# Patient Record
Sex: MALE | Race: OTHER | HISPANIC OR LATINO | URBAN - METROPOLITAN AREA
[De-identification: names, ages, dates, MRNs, and addresses within clinical notes are randomized per-mention and may not be internally consistent; named-entity substitution may affect disease eponyms.]

---

## 2024-07-20 ENCOUNTER — EMERGENCY (EMERGENCY)
Facility: HOSPITAL | Age: 39
LOS: 1 days | Discharge: HOME | End: 2024-07-20
Attending: STUDENT IN AN ORGANIZED HEALTH CARE EDUCATION/TRAINING PROGRAM
Payer: SELF-PAY

## 2024-07-20 VITALS
RESPIRATION RATE: 18 BRPM | SYSTOLIC BLOOD PRESSURE: 133 MMHG | OXYGEN SATURATION: 98 % | HEART RATE: 50 BPM | DIASTOLIC BLOOD PRESSURE: 80 MMHG | TEMPERATURE: 99 F

## 2024-07-20 PROCEDURE — 99283 EMERGENCY DEPT VISIT LOW MDM: CPT | Mod: 25

## 2024-07-20 PROCEDURE — 99284 EMERGENCY DEPT VISIT MOD MDM: CPT

## 2024-07-20 PROCEDURE — 73140 X-RAY EXAM OF FINGER(S): CPT

## 2024-07-20 PROCEDURE — 73130 X-RAY EXAM OF HAND: CPT | Mod: 26,RT

## 2024-07-20 PROCEDURE — 73130 X-RAY EXAM OF HAND: CPT

## 2024-07-20 PROCEDURE — 73140 X-RAY EXAM OF FINGER(S): CPT | Mod: 26,RT

## 2024-07-20 PROCEDURE — 10120 INC&RMVL FB SUBQ TISS SMPL: CPT

## 2024-07-20 RX ORDER — TETANUS TOXOID, REDUCED DIPHTHERIA TOXOID AND ACELLULAR PERTUSSIS VACCINE, ADSORBED 5; 2.5; 8; 8; 2.5 [IU]/.5ML; [IU]/.5ML; UG/.5ML; UG/.5ML; UG/.5ML
0.5 SUSPENSION INTRAMUSCULAR ONCE
Refills: 0 | Status: COMPLETED | OUTPATIENT
Start: 2024-07-20 | End: 2024-07-20

## 2024-07-20 RX ORDER — CEPHALEXIN 500 MG
500 CAPSULE ORAL ONCE
Refills: 0 | Status: COMPLETED | OUTPATIENT
Start: 2024-07-20 | End: 2024-07-20

## 2024-07-20 RX ORDER — CEPHALEXIN 500 MG
1 CAPSULE ORAL
Qty: 28 | Refills: 0
Start: 2024-07-20 | End: 2024-07-26

## 2024-07-20 RX ADMIN — Medication 500 MILLIGRAM(S): at 11:10

## 2024-07-20 NOTE — ED PROVIDER NOTE - ATTENDING APP SHARED VISIT CONTRIBUTION OF CARE
38yom with splinter injury to finger. does not appear infected. will get xr r/o other traumatic injury. wound care

## 2024-07-20 NOTE — ED PROVIDER NOTE - CARE PROVIDER_API CALL
Negrita Montilla  Orthopaedic Surgery  [address] [place] [address]  Phone: [phone]  Fax: [phone]  Follow Up Time:

## 2024-07-20 NOTE — ED PROVIDER NOTE - SKIN, MLM
+ abrasion a the dorsal aspect of the right thumb distal to the IP joint with possible FB underneath surface of skin. + localized 1cm x 1cm region of swelling of the ulnar aspect of hand with no secondary signs of infection present.

## 2024-07-20 NOTE — ED PROVIDER NOTE - CLINICAL SUMMARY MEDICAL DECISION MAKING FREE TEXT BOX
38-year-old male presented to the ED complaining of right thumb splinter x 1 day ago.  Patient states that he also had a splinter in the same hand x 3 months ago that has recently become more swollen. X-rays reviewed–no identifiable foreign body based on x-rays.    Right thumb splinter removed and intact.  Swelling of the lateral aspect of the hand likely old foreign body that has begun growing out of the skin.  No identifiable foreign body at the surface of the skin with no secondary signs of infection present.  Patient stable for discharge recommend to follow-up with hand specialist.  Return precautions discussed with patient.

## 2024-07-20 NOTE — ED PROVIDER NOTE - OBJECTIVE STATEMENT
38-year-old male presented to the ED complaining of right thumb splinter x 1 day ago.  Patient states that he also had a splinter in the same hand x 3 months ago that has recently become more swollen. Pt otherwise denies fever/chills, c/p, sob, abd pain, n/v/c/d, dysuria, numbness/tingling/weakness and has no other complaints at this time.

## 2024-07-20 NOTE — ED PROVIDER NOTE - PATIENT PORTAL LINK FT
You can access the FollowMyHealth Patient Portal offered by [facility] by registering at the following website: [email or web address]. By joining [employer]’s FollowMyHealth portal, you will also be able to view your health information using other applications (apps) compatible with our system.

## 2024-07-20 NOTE — ED PROCEDURE NOTE - CPROC ED ANATOMIC LOCATION1
thumb
I will SWITCH the dose or number of times a day I take the medications listed below when I get home from the hospital:  None

## 2024-08-08 NOTE — ED ADULT NURSE NOTE - CADM POA PRESS ULCER
-- DO NOT REPLY / DO NOT REPLY ALL --  -- This inbox is not monitored. If this was sent to the wrong provider or department, reroute message to [facility] pool. --  -- Message is from [facility] [employer] ([facility]) --    General Patient Message: Patient calling Suni back. Reached out to chat and informed by Marian to send message. Please call back anytime today.   Caller Information         Type Contact Phone/Fax    08/07/2024 10:52 PM CDT Phone (Incoming)      08/08/2024 08:20 AM CDT Phone (Incoming) Timothy Damon (Self) [phone] (M)            Alternative phone number: No    Can a detailed message be left? Yes - Voicemail      Patient has been advised the message will be addressed within 2-3 business days.             No

## 2025-07-27 NOTE — ED PROVIDER NOTE - CPE EDP SKIN NORM
Since Tuesday has had back/flank pain. Denies urinary symptoms. Has tried head/ice/OTC med with change. Denies injury/trauma. Denies fevers. History of kidney stone.     Triage Assessment (Adult)       Row Name 07/27/25 0932          Triage Assessment    Airway WDL WDL        Respiratory WDL    Respiratory WDL WDL        Skin Circulation/Temperature WDL    Skin Circulation/Temperature WDL WDL        Cardiac WDL    Cardiac WDL WDL        Peripheral/Neurovascular WDL    Peripheral Neurovascular WDL WDL        Cognitive/Neuro/Behavioral WDL    Cognitive/Neuro/Behavioral WDL WDL            normal...